# Patient Record
Sex: FEMALE | URBAN - METROPOLITAN AREA
[De-identification: names, ages, dates, MRNs, and addresses within clinical notes are randomized per-mention and may not be internally consistent; named-entity substitution may affect disease eponyms.]

---

## 2018-10-04 ENCOUNTER — APPOINTMENT (OUTPATIENT)
Dept: URBAN - METROPOLITAN AREA CLINIC 236 | Age: 53
Setting detail: DERMATOLOGY
End: 2018-10-09

## 2018-10-04 DIAGNOSIS — T07XXXA INSECT BITE, NONVENOMOUS, OF OTHER, MULTIPLE, AND UNSPECIFIED SITES, WITHOUT MENTION OF INFECTION: ICD-10-CM

## 2018-10-04 PROBLEM — T07.XXXA UNSPECIFIED MULTIPLE INJURIES, INITIAL ENCOUNTER: Status: ACTIVE | Noted: 2018-10-04

## 2018-10-04 PROCEDURE — 99202 OFFICE O/P NEW SF 15 MIN: CPT

## 2018-10-04 PROCEDURE — OTHER PRESCRIPTION: OTHER

## 2018-10-04 PROCEDURE — OTHER COUNSELING: OTHER

## 2018-10-04 PROCEDURE — OTHER OTHER: OTHER

## 2018-10-04 RX ORDER — MUPIROCIN 20 MG/G
OINTMENT TOPICAL
Qty: 1 | Refills: 0 | Status: ERX | COMMUNITY
Start: 2018-10-04

## 2018-10-04 RX ORDER — DOXYCYCLINE HYCLATE 100 MG/1
CAPSULE, GELATIN COATED ORAL
Qty: 42 | Refills: 0 | Status: ERX | COMMUNITY
Start: 2018-10-04

## 2018-10-04 NOTE — HPI: SKIN LESIONS
Have Your Skin Lesions Been Treated?: been treated
Is This A New Presentation, Or A Follow-Up?: Growths
done

## 2018-10-04 NOTE — PROCEDURE: OTHER
Other (Free Text): Please draw Lyme Titre, Igg, Igm Varicella Zoster
Note Text (......Xxx Chief Complaint.): This diagnosis correlates with the
Detail Level: Zone

## 2018-10-25 ENCOUNTER — APPOINTMENT (OUTPATIENT)
Dept: URBAN - METROPOLITAN AREA CLINIC 236 | Age: 53
Setting detail: DERMATOLOGY
End: 2018-10-30

## 2018-10-25 DIAGNOSIS — L82.0 INFLAMED SEBORRHEIC KERATOSIS: ICD-10-CM

## 2018-10-25 DIAGNOSIS — T07XXXA INSECT BITE, NONVENOMOUS, OF OTHER, MULTIPLE, AND UNSPECIFIED SITES, WITHOUT MENTION OF INFECTION: ICD-10-CM

## 2018-10-25 PROBLEM — S30.861A INSECT BITE (NONVENOMOUS) OF ABDOMINAL WALL, INITIAL ENCOUNTER: Status: ACTIVE | Noted: 2018-10-25

## 2018-10-25 PROCEDURE — OTHER LAB REPORTS REVIEWED: OTHER

## 2018-10-25 PROCEDURE — OTHER COUNSELING: OTHER

## 2018-10-25 PROCEDURE — 99213 OFFICE O/P EST LOW 20 MIN: CPT

## 2018-10-25 ASSESSMENT — LOCATION SIMPLE DESCRIPTION DERM: LOCATION SIMPLE: ABDOMEN

## 2018-10-25 ASSESSMENT — LOCATION DETAILED DESCRIPTION DERM
LOCATION DETAILED: EPIGASTRIC SKIN
LOCATION DETAILED: RIGHT RIB CAGE

## 2018-10-25 ASSESSMENT — LOCATION ZONE DERM: LOCATION ZONE: TRUNK

## 2018-10-25 NOTE — PROCEDURE: LAB REPORTS REVIEWED
Detail Level: Zone
Summarized Lab Results: Negative Lyme IgG and IgM, Positive past exposure Varicella Zoster IgG. At Dr. Schilling’s request called lab requesting why IgM was not read for Varicella Zoster. I spoke with Emiliana and was told she was unsure of reason. Blood vial no longer available.